# Patient Record
Sex: FEMALE | Race: WHITE | NOT HISPANIC OR LATINO | URBAN - METROPOLITAN AREA
[De-identification: names, ages, dates, MRNs, and addresses within clinical notes are randomized per-mention and may not be internally consistent; named-entity substitution may affect disease eponyms.]

---

## 2018-05-30 ENCOUNTER — RECORDS - HEALTHEAST (OUTPATIENT)
Dept: LAB | Facility: CLINIC | Age: 68
End: 2018-05-30

## 2018-05-30 LAB
ANION GAP SERPL CALCULATED.3IONS-SCNC: 8 MMOL/L (ref 5–18)
BASOPHILS # BLD AUTO: 0 THOU/UL (ref 0–0.2)
BASOPHILS NFR BLD AUTO: 1 % (ref 0–2)
BUN SERPL-MCNC: 19 MG/DL (ref 8–22)
CALCIUM SERPL-MCNC: 10 MG/DL (ref 8.5–10.5)
CHLORIDE BLD-SCNC: 105 MMOL/L (ref 98–107)
CO2 SERPL-SCNC: 30 MMOL/L (ref 22–31)
CREAT SERPL-MCNC: 0.86 MG/DL (ref 0.6–1.1)
EOSINOPHIL # BLD AUTO: 0.2 THOU/UL (ref 0–0.4)
EOSINOPHIL NFR BLD AUTO: 4 % (ref 0–6)
ERYTHROCYTE [DISTWIDTH] IN BLOOD BY AUTOMATED COUNT: 15.1 % (ref 11–14.5)
FERRITIN SERPL-MCNC: 270 NG/ML (ref 10–130)
GFR SERPL CREATININE-BSD FRML MDRD: >60 ML/MIN/1.73M2
GLUCOSE BLD-MCNC: 79 MG/DL (ref 70–125)
HCT VFR BLD AUTO: 34.5 % (ref 35–47)
HGB BLD-MCNC: 10.5 G/DL (ref 12–16)
LYMPHOCYTES # BLD AUTO: 1.7 THOU/UL (ref 0.8–4.4)
LYMPHOCYTES NFR BLD AUTO: 28 % (ref 20–40)
MCH RBC QN AUTO: 28.2 PG (ref 27–34)
MCHC RBC AUTO-ENTMCNC: 30.4 G/DL (ref 32–36)
MCV RBC AUTO: 93 FL (ref 80–100)
MONOCYTES # BLD AUTO: 0.3 THOU/UL (ref 0–0.9)
MONOCYTES NFR BLD AUTO: 6 % (ref 2–10)
NEUTROPHILS # BLD AUTO: 3.6 THOU/UL (ref 2–7.7)
NEUTROPHILS NFR BLD AUTO: 62 % (ref 50–70)
PLATELET # BLD AUTO: 212 THOU/UL (ref 140–440)
PMV BLD AUTO: 9 FL (ref 8.5–12.5)
POTASSIUM BLD-SCNC: 3.9 MMOL/L (ref 3.5–5)
RBC # BLD AUTO: 3.72 MILL/UL (ref 3.8–5.4)
SODIUM SERPL-SCNC: 143 MMOL/L (ref 136–145)
WBC: 5.8 THOU/UL (ref 4–11)

## 2020-10-19 ENCOUNTER — HOSPITAL ENCOUNTER (OUTPATIENT)
Dept: NEUROLOGY | Facility: CLINIC | Age: 70
Setting detail: THERAPIES SERIES
Discharge: STILL ADMITTED OR ADMINISTRATIVE | End: 2020-10-19
Attending: PSYCHOLOGIST

## 2020-10-19 DIAGNOSIS — F43.23 ADJUSTMENT DISORDER WITH MIXED ANXIETY AND DEPRESSED MOOD: ICD-10-CM

## 2020-11-12 ENCOUNTER — HOSPITAL ENCOUNTER (OUTPATIENT)
Dept: NEUROLOGY | Facility: CLINIC | Age: 70
Setting detail: THERAPIES SERIES
Discharge: STILL ADMITTED OR ADMINISTRATIVE | End: 2020-11-12
Attending: PSYCHOLOGIST

## 2020-11-12 DIAGNOSIS — F43.23 ADJUSTMENT DISORDER WITH MIXED ANXIETY AND DEPRESSED MOOD: ICD-10-CM

## 2020-12-07 ENCOUNTER — HOSPITAL ENCOUNTER (OUTPATIENT)
Dept: NEUROLOGY | Facility: CLINIC | Age: 70
Setting detail: THERAPIES SERIES
Discharge: STILL ADMITTED OR ADMINISTRATIVE | End: 2020-12-07
Attending: PSYCHOLOGIST

## 2020-12-07 DIAGNOSIS — F43.23 ADJUSTMENT DISORDER WITH MIXED ANXIETY AND DEPRESSED MOOD: ICD-10-CM

## 2020-12-28 ENCOUNTER — HOSPITAL ENCOUNTER (OUTPATIENT)
Dept: NEUROLOGY | Facility: CLINIC | Age: 70
Setting detail: THERAPIES SERIES
Discharge: STILL ADMITTED OR ADMINISTRATIVE | End: 2020-12-28
Attending: PSYCHOLOGIST

## 2020-12-28 DIAGNOSIS — F33.0 MAJOR DEPRESSIVE DISORDER, RECURRENT EPISODE, MILD WITH ANXIOUS DISTRESS (H): ICD-10-CM

## 2021-01-13 ENCOUNTER — HOSPITAL ENCOUNTER (OUTPATIENT)
Dept: NEUROLOGY | Facility: CLINIC | Age: 71
Setting detail: THERAPIES SERIES
Discharge: STILL ADMITTED OR ADMINISTRATIVE | End: 2021-01-13
Attending: PSYCHOLOGIST

## 2021-01-13 DIAGNOSIS — F32.1 MAJOR DEPRESSIVE DISORDER, SINGLE EPISODE, MODERATE WITH ANXIOUS DISTRESS (H): ICD-10-CM

## 2021-02-03 ENCOUNTER — HOSPITAL ENCOUNTER (OUTPATIENT)
Dept: NEUROLOGY | Facility: CLINIC | Age: 71
Setting detail: THERAPIES SERIES
Discharge: STILL ADMITTED OR ADMINISTRATIVE | End: 2021-02-03
Attending: PSYCHOLOGIST

## 2021-02-03 DIAGNOSIS — F33.0 MAJOR DEPRESSIVE DISORDER, RECURRENT EPISODE, MILD WITH ANXIOUS DISTRESS (H): ICD-10-CM

## 2021-02-16 ENCOUNTER — HOSPITAL ENCOUNTER (OUTPATIENT)
Dept: NEUROLOGY | Facility: CLINIC | Age: 71
Setting detail: THERAPIES SERIES
Discharge: STILL ADMITTED OR ADMINISTRATIVE | End: 2021-02-16
Attending: PSYCHOLOGIST

## 2021-02-16 DIAGNOSIS — F33.0 MAJOR DEPRESSIVE DISORDER, RECURRENT EPISODE, MILD WITH ANXIOUS DISTRESS (H): ICD-10-CM

## 2021-03-04 ENCOUNTER — HOSPITAL ENCOUNTER (OUTPATIENT)
Dept: NEUROLOGY | Facility: CLINIC | Age: 71
Setting detail: THERAPIES SERIES
Discharge: STILL ADMITTED OR ADMINISTRATIVE | End: 2021-03-04
Attending: PSYCHOLOGIST

## 2021-03-04 DIAGNOSIS — F32.0 MAJOR DEPRESSIVE DISORDER, SINGLE EPISODE, MILD WITH ANXIOUS DISTRESS (H): ICD-10-CM

## 2021-03-24 ENCOUNTER — HOSPITAL ENCOUNTER (OUTPATIENT)
Dept: NEUROLOGY | Facility: CLINIC | Age: 71
Setting detail: THERAPIES SERIES
Discharge: STILL ADMITTED OR ADMINISTRATIVE | End: 2021-03-24
Attending: PSYCHOLOGIST

## 2021-03-24 DIAGNOSIS — F32.0 MAJOR DEPRESSIVE DISORDER, SINGLE EPISODE, MILD WITH ANXIOUS DISTRESS (H): ICD-10-CM

## 2021-05-25 ENCOUNTER — RECORDS - HEALTHEAST (OUTPATIENT)
Dept: ADMINISTRATIVE | Facility: CLINIC | Age: 71
End: 2021-05-25

## 2021-05-26 ENCOUNTER — RECORDS - HEALTHEAST (OUTPATIENT)
Dept: ADMINISTRATIVE | Facility: CLINIC | Age: 71
End: 2021-05-26

## 2021-05-27 ENCOUNTER — RECORDS - HEALTHEAST (OUTPATIENT)
Dept: ADMINISTRATIVE | Facility: CLINIC | Age: 71
End: 2021-05-27

## 2021-05-28 ENCOUNTER — RECORDS - HEALTHEAST (OUTPATIENT)
Dept: ADMINISTRATIVE | Facility: CLINIC | Age: 71
End: 2021-05-28

## 2021-05-29 ENCOUNTER — RECORDS - HEALTHEAST (OUTPATIENT)
Dept: ADMINISTRATIVE | Facility: CLINIC | Age: 71
End: 2021-05-29

## 2021-05-30 ENCOUNTER — RECORDS - HEALTHEAST (OUTPATIENT)
Dept: ADMINISTRATIVE | Facility: CLINIC | Age: 71
End: 2021-05-30

## 2021-06-12 NOTE — PROGRESS NOTES
"Initial Psychology Consultation (Standard)    Aurelia Carrion  [id number]  Consult Date: 10/19/2020    Reason for Referral:  The patient is a 69 y.o. year old,  individual who is self-referred  for an evaluation of emotional and behavioral functioning.   This writer originally had contact with the patient's  who was referred to address coping with cognitive change.  We mutually agreed that the patient would benefit from psychology services more than her  as she adjusts to her caregiver role. Collateral information was obtained from a review of the electronic medical record via [employer].   Patient was informed of the role of psychology services, the foreseeable risks and benefits, the limits of confidentiality, and the responsibilities of a mandated .   The patient agreed to proceed.  Patient's  is aware that the the patient is meeting with this writer.    After review of the patient's situation, this visit was changed from an in-person visit to a video visit via [employer] (by email invitation) to reduce the risk of COVID 19 exposure.  Patient was informed that policies and procedures that govern in-person sessions would also apply to video sessions.  Patient was also informed that video sessions would be discontinued when COVID 19 exposure is no longer a concern (as determined by [facility]).      Patient distance location: home  Provider distance location: [facility] Neurology [place]/[facility]    Patient was in agreement with proceeding with a video session.      History of Presenting problem: Patient reports frequent fear about \"what's next? What will the future bring? What will it be like?\"  She also describes lacking confidence in her caregiving \"I doubt myself.  I envisioned life so differently\"  Patient reflects on \"I have no control\" and \"It is the hardest thing I have ever faced.\"  \"My whole life has changed and I haven't " "accepted it yet.\"    We discuss the numerous ingredients involved in acceptance and resilience.  The writer reassures the patient that she is already utilizing numerous strategies to navigate her situation successfully.  This writer encourages the patient to resume soothing activities like sewing which is relaxing for her.      Patient reflects how COVID and the pending winter complicate her situation.  She is able to identify a number of meaningful activities that would work for the winter (games, puzzles, walking, stationary bike, yoga, looking at photo albums).    Patient indicates that her  is currently going through an evaluation at [facility] to obtain another opinion.  She reports that he was started on paxil but she does not see any difference in his anxiety or preoccupation with food.  Patient reports feeling that obtaining an opinion from [facility] will help her to deepen her acceptance of her situation.    Patient endorses depressed and anxious mood.  She denies suicidal ideation, intention, or plan.  She endorsed feeling anger and irritability.  Patient also reports feeling guilt when she enjoys herself when spending time with others without her .    No standardized assessment tools were administered.    No non-personal contextual factors are reported.    Social History: Patient lives in her own home with her  of 50 plus years.  She has 2 adult children who are actively involved in her life.  She reports having an excellent support system of friends and family.  Patient does not report economic concerns.  She does not report a large role for her spiritual beliefs or Sabianist community.  Patient reports no history of abuse.  She achieved developmental milestones in the expected fashion.  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  She is retired from working for the [employer] in the [employer] program.    Medical History: Patient reports " being in good general physical health. The patient does not report cultural factors impacting pursuit of care.  The patient pursues standard medical care. Patient's history is significant for hypertension, hyperlipidemia, Rheumatoid Arthristis, Chronic kidney disease, osteopenia, history of cervical cancer in 2013.  Family history is significant for her father () with a history of colon cancer and her mother (deceasd) with hypertension, glaucoma, and osteoporosis.    Medications include: lisinopril, norvasc, hydrochlorothiazide, methotrexate.    Psychiatric History: Patient reports no history significant for mental health or chemical health issues.  Family history is significant for her father with alcohol use issues and her daughter with depression.  A CAGE Questionnaire was not administered secondary to absence of chemical use history.    Mental Status Exam:    Grooming:  good    Attire:  appropriate    Age:  Looks stated age    Attitude during interview: friendly    Participation: cooperative     Motor activity:  Within normal limits (WNL); ambulation not observed    Eye contact:  appropriate    Mood:  stable    Affect:  tearful    Speech/language:  WNL    Attention:  WNL    Concentration:  WNL    Thought Process:  stable    Thought Content:  WNL    Orientation:  Fully oriented    Memory:  WNL    Judgement: WNL    Estimated intelligence:  average    Demonstrated insight: good    Fund of Knowledge: good    Clinical Summary:  Patient is a 69 year old,  individual who is self-referred for support as she navigates acceptance of cognitive change in her  and her cargiving role.  This writer originally met the patient when her  was referred for treatment of depression as it related to cognitive change.  We mutually agreed that her  seemed to be coping reasonably well.  The patient, however, would benefit from support.  The patient and her  were in agreement that the psychology  services would be utilized by the patient.    The patient endorses sad and anxious mood as she tries to deepen her acceptance of her 's changing cognition.  She also describes experiencing significant anger about her situation.  We discussed a variety of strategies including writing down her angry thoughts as a place to start to deepen her acceptance as well as continuing with additional sessions with this writer.    Patient strengths include no premorbid history significant for mental health or chemical health issues and an excellent support system.  Patient also has good insight into her emotions and experience.    Diagnosis: Adjustment disorder with depressed and anxious mood    Plan:  Patient will return in 2 weeks where we will continue cognitive behavioral therapy to promote adaptive coping with new care giver role and acceptance of her 's changing cognition.  Estimated duration of services is 4 sessions (77824) at 2 week intervals and 3 sessions (21469) at one month intervals.  It is expected that treatment will be completed by 4/1/2021.  Patient was in agreement with this plan.  Research suggests that untreated mood disturbances associated with being a care giver may result in the development of further psychiatric and medical problems.  Thus, further services appear warranted at this time.

## 2021-06-13 NOTE — PROGRESS NOTES
Psychology Progress Note    Date: 11/12/2020    Time length and type of treatment: 1709-6693 , individual therapy, telephone/video visit    Necessity: This session is necessary to address adjustment issues with features of depression and anxiety as the patient adapts to the caregiver role for her  with cognitive change.  We initiate the patient's treatment plan.  Patient rates her anxiety on the CONNOR-7 as 8 (out of 21) indicating moderate anxiety.  Patient rates her depression on the PHQ-9 as 7 (out of 27) as 7 indicating moderate depression.  Patient gives her verbal consent to her treatment plan which we discuss.  Verbal consent is in lieu of a signature due to the nature of a virtual visit.  Today we focus on the patient's depression and anxiety treatment plan, specifically exploring processing grief and relaxation techniques.  The reader is invited to review the patient's full treatment plan in the Media section of the patient's Epic medical record.     After review of the patient's situation, this visit was changed from an in-person visit to a video/telephone visit via [employer] (by invitation) to reduce the risk of COVID 19 exposure. We were forced to complete the session by telephone due to connectivity issues. Patient was informed that policies and procedures that govern in-person sessions would also apply to video/telephone sessions.  Patient was also informed that video/telephone sessions would be discontinued when COVID 19 exposure is no longer a concern (as determined by [facility]).      Patient distance location: home  Provider distance location: [facility] Neurology [place]/[facility]    Patient was in agreement with proceeding with a video/telephone session.    Intervention: Patient reports having a more difficult time with the arrival of winter and finding a routine and rhythm that works for her  and her.  She describes meetings with [place] which  "confirm her 's cognitive change still with a diagnosis of MCI.  Patient indicates growing acceptance and that there is no \"magic pill.\"    We explore the patient's grief and the way in which it underlies her feelings of anger and irritability.  Patient acknowledges trying to stop her tears \"because I need to be strong.\"  We discuss the importance of allowing herself to cry.  She may see that as tears increase that irritability will decrease.  Eventually, tears will also decrease.  Patient reports finding this discussion helpful.      This writer utilized motivational interviewing, active listening, reassurance and support in the context of cognitive behavioral therapy and ACT therapy to address the above.      Mental Status: Orientation to person, place, and time is in tact.  Recent and remote memory, attention, concentration, language, and fund of knowledge are intact.  Mood appears stable overall with tearful affect.  No indication of suicidal ideation, plan, or intent.  No indication of homicidal ideation, plan or intent.    Progress: Patient reports continuing to struggle with acceptance of her situation as a caregiver. Progress is good with maintaining stable mood overall and deepening insight and acceptance regarding her situation    Plan: Patient will return in 2 weeks where we will continue cognitive behavioral therapy to promote adaptive coping with new care giver role and acceptance of her 's changing cognition.  Estimated duration of services is 4 sessions (71308) at 2 week intervals and 2 sessions (40075) at one month intervals.  It is expected that treatment will be completed by 3/1/2021.  Patient was in agreement with this plan.  Research suggests that untreated mood disturbances associated with being a care giver may result in the development of further psychiatric and medical problems.  Thus, further services appear warranted at this time.    Diagnosis:  Adjustment disorder with " depressed and anxious mood

## 2021-06-13 NOTE — PROGRESS NOTES
"Psychology Progress Note    Date: 12/7/2020    Time length and type of treatment: 2391-8005 , individual therapy, telephone visit    Necessity: This session is necessary to address adjustment issues with features of depression and anxiety as the patient adapts to the caregiver role for her  with cognitive change.   Today we focus on the patient's depression and anxiety treatment plan, specifically exploring processing grief, cognitive messages that exacerbate depression and anxiety, and relaxation techniques.  The reader is invited to review the patient's full treatment plan in the Media section of the patient's Epic medical record.     After review of the patient's situation, this visit was changed from an in-person visit to a video/telephone visit via [employer] (by invitation) to reduce the risk of COVID 19 exposure. We were forced to complete the session by telephone due to connectivity issues. Patient was informed that policies and procedures that govern in-person sessions would also apply to video/telephone sessions.  Patient was also informed that video/telephone sessions would be discontinued when COVID 19 exposure is no longer a concern (as determined by [facility]).       Patient distance location: home  Provider distance location: [facility] Neurology [place]/[facility]     Patient was in agreement with proceeding with a video/telephone session.    Intervention: Patient reports \"good days and bad days\".  Patient reports that she becomes upset and frustrated because of her 's eating behaviors and isolation due to the pandemic.  Patient reflects on how difficult it is to not be able to access resources because of the pandemic.  Patient acknowledges meeting neighbors when they go on walks.  We explore how the patient can trust herself more.  We explore how the patient can be less critical of herself.      This writer utilized motivational interviewing, " "active listening, reassurance and support in the context of cognitive behavioral therapy and ACT therapy to address the above.      Mental Status: Orientation to person, place, and time is in tact.  Recent and remote memory, attention, concentration, language, and fund of knowledge are intact. Mood appears stable with sad and tearful affect.  No indication of suicidal ideation, plan, or intent.  No indication of homicidal ideation, plan or intent.    Progress: Patient reports having \"good days and bad days\".  Progress is improving with deepening acceptance of her 's changes.  Progress is slow with compassion towards herself.    Plan: Patient will return in 2 weeks where we will continue cognitive behavioral therapy to promote adaptive coping with new care giver role and acceptance of her 's changing cognition.  Estimated duration of services is 4 sessions (69894) at 2 week intervals and 2 sessions (84274) at one month intervals.  It is expected that treatment will be completed by 3/1/2021.  Patient was in agreement with this plan.  Research suggests that untreated mood disturbances associated with being a care giver may result in the development of further psychiatric and medical problems.  Thus, further services appear warranted at this time.    Diagnosis:  Adjustment disorder with depressed and anxious mood  "

## 2021-06-14 NOTE — PROGRESS NOTES
"Psychology Progress Note    Date: 1/13/2021    Time length and type of treatment: 2105-0754 , individual therapy, telephone visit    Necessity:  This session is necessary to address adjustment issues with features of depression and anxiety as the patient adapts to the caregiver role for her  with cognitive change.   Today we focus on the patient's depression and anxiety treatment plan, specifically exploring processing grief, cognitive messages that exacerbate depression and anxiety, and relaxation techniques.  The reader is invited to review the patient's full treatment plan in the Media section of the patient's Epic medical record.     After review of the patient's situation, this visit was changed from an in-person visit to a telephone visit via [employer] to reduce the risk of COVID 19 exposure.  Patient was given the option of a video visit.  We attempted to connect via [employer] but did not succeed.   Patient was informed that policies and procedures that govern in-person sessions would also apply to telephone sessions.  Patient was also informed that telephone sessions would be discontinued when COVID 19 exposure is no longer a concern (as determined by [facility]).      Patient distance location: telephone  Provider distance location: [facility] Neurology [place]/[facility]    Patient was in agreement with proceeding with a telephone session.    Intervention: \"I'm the same\".  Patient reports continuing to have angry outbursts.  We explore what is contributing to the patient's anger.      \"Our daughters want us to move.\"  Patient reports that taking care of the house is too much.  Patient reports \"I am scared to give up the house.  It gives me something to do.\"  Patient reports worry about the impact on her  if they move.  This writer reassures the patient that her  will make a good transition and will follow her lead.  Patient acknowledges some " excitement aobut planning a move.    Patient will be meeting with PCP to discuss anti-depressant.    This writer utilized motivational interviewing, active listening, reassurance and support in the context of cognitive behavioral therapy and ACT therapy to address the above.      Mental Status: Orientation to person, place, and time is in tact.  Recent and remote memory, attention, concentration, language, and fund of knowledge are intact.  Mood appears depressed with tearful affect.  No indication of suicidal ideation, plan, or intent.  No indication of homicidal ideation, plan or intent.    Progress: Patient reports continuing to feel angry and sad.  Progress is slow with stabilizing mood.    Plan: Patient will return in 2 weeks where we will continue cognitive behavioral therapy to promote adaptive coping with new care giver role and acceptance of her 's changing cognition.  Estimated duration of services is 4 sessions (45849) at 2 week intervals and 2 sessions (11597) at one month intervals.  It is expected that treatment will be completed by 5/1/2021.  Patient was in agreement with this plan.  Research suggests that untreated mood disturbances associated with being a care giver may result in the development of further psychiatric and medical problems.  Thus, further services appear warranted at this time.    Diagnosis:  Major depressive disorder, single episode, mild to moderate with anxious distress

## 2021-06-14 NOTE — PROGRESS NOTES
"Psychology Progress Note    Date: 2/3/2021    Time length and type of treatment: 2218-9003, individual therapy, telephone visit    Necessity:  This session is necessary to address adjustment issues with features of depression and anxiety as the patient adapts to the caregiver role for her  with cognitive change.   Today we focus on the patient's depression and anxiety treatment plan, specifically exploring processing grief, cognitive messages that exacerbate depression and anxiety, and relaxation techniques.  The reader is invited to review the patient's full treatment plan in the Media section of the patient's Epic medical record.     After review of the patient's situation, this visit was changed from an in-person visit to a telephone visit via [email or web address] to reduce the risk of COVID 19 exposure.  Patient was given the option of a video visit.  We attempted to connect via [employer] but did not succeed.   Patient was informed that policies and procedures that govern in-person sessions would also apply to telephone sessions.  Patient was also informed that telephone sessions would be discontinued when COVID 19 exposure is no longer a concern (as determined by [facility]).       Patient distance location: telephone  Provider distance location: [facility] Neurology [place]/[facility]     Patient was in agreement with proceeding with a telephone session.    Intervention: Patient reports doing better since starting anti-depressant medication.  Patient reports feeling more accepting of her situation.      We discuss how the patient would like to feel more confidence in her 's physician.  Patient reports continuing to feel \"overwhelmed.\"   We discuss a strategy for approaching projects like taxes (short 30 minute sessions).      Patient reports that a daily meditation/reflection mehnaz has helped.    Patient wonders when her  should retest.  We discuss the options.  This " writer encourages the patient to check in with her 's neurologist and neuropsychologist.    This writer utilized motivational interviewing, active listening, reassurance and support in the context of cognitive behavioral therapy and ACT therapy to address the above.      Mental Status: Orientation to person, place, and time is in tact.  Recent and remote memory, attention, concentration, language, and fund of knowledge are intact.  Mood appears stable with tearful, anxious affect.  No indication of suicidal ideation, plan, or intent.  No indication of homicidal ideation, plan or intent.    Progress: Patient reports doing a little better.  Progress is good with stabilizing mood and deepening acceptance.    Plan: Patient will return in 2 weeks where we will continue cognitive behavioral therapy to promote adaptive coping with new care giver role and acceptance of her 's changing cognition.  Estimated duration of services is 4 sessions (14789) at 2 week intervals and 2 sessions (55927) at one month intervals.  It is expected that treatment will be completed by 5/1/2021.  Patient was in agreement with this plan.  Research suggests that untreated mood disturbances associated with being a care giver may result in the development of further psychiatric and medical problems.  Thus, further services appear warranted at this time.    Diagnosis:  Major depressive disorder, single episode, mild to moderate with anxious distress

## 2021-06-14 NOTE — PROGRESS NOTES
"Psychology Progress Note    Date: 12/28/2020    Time length and type of treatment: 4941-4754 , individual therapy, video visit    Necessity: This session is necessary to address adjustment issues with features of depression and anxiety as the patient adapts to the caregiver role for her  with cognitive change.   Today we focus on the patient's depression and anxiety treatment plan, specifically exploring processing grief, cognitive messages that exacerbate depression and anxiety, and relaxation techniques.  The reader is invited to review the patient's full treatment plan in the Media section of the patient's Epic medical record.     After review of the patient's situation, this visit was changed from an in-person visit to a video visit via [employer] (via email invitation) to reduce the risk of COVID 19 exposure.    Patient was informed that policies and procedures that govern in-person sessions would also apply to video sessions.  Patient was also informed that video sessions would be discontinued when COVID 19 exposure is no longer a concern (as determined by [facility]).      Patient distance location: home  Provider distance location: [facility] [place]/[facility]    Patient was in agreement with proceeding with a video session.    Intervention: Patient reports that she has not been doing well.  She describes frustration, losing her temper, feelings of hopelessness, feelings that she is a failure as a caregiver, frequent tearfulness.  \"I just don't feel like myself.\"  Patient also reports sleeping poorly.  We explore the patient's concerns about starting medication and beliefs about herself that she might need medication.  This writer provides education about depression and the value of medication.  We discuss that the patient will approach her PCP about starting an anti-depressant.    We also discuss strategies to introduce more relaxed, peaceful times " in the day such as sharing a daily meditation or prayer or encouraging words at meal time.    This writer utilized motivational interviewing, active listening, reassurance and support in the context of cognitive behavioral therapy and ACT therapy to address the above.      Mental Status: Orientation to person, place, and time is in tact.  Recent and remote memory, attention, concentration, language, and fund of knowledge are intact.  Mood appears depressed with tearful affect.  No indication of suicidal ideation, plan, or intent.  No indication of homicidal ideation, plan or intent.    Progress: Patient reports not doing well emotionally (anger, frustration, tearfulness, poor sleep).  Progress is slow with stabilizing mood.    Plan: Patient will return in 2 weeks where we will continue cognitive behavioral therapy to promote adaptive coping with new care giver role and acceptance of her 's changing cognition.  Estimated duration of services is 4 sessions (27518) at 2 week intervals and 2 sessions (23079) at one month intervals.  It is expected that treatment will be completed by 5/1/2021.  Patient was in agreement with this plan.  Research suggests that untreated mood disturbances associated with being a care giver may result in the development of further psychiatric and medical problems.  Thus, further services appear warranted at this time.    Diagnosis:  Major depressive disorder, single episode,mild to moderate with anxious distress

## 2021-06-15 NOTE — PROGRESS NOTES
"Psychology Progress Note    Date: 2/16/2021    Time length and type of treatment: 5456-8432 , individual therapy, video visit    Necessity: This session is necessary to address adjustment issues with features of depression and anxiety as the patient adapts to the caregiver role for her  with cognitive change.    We update the patient's treatment plan today.  Patient rates her anxiety on the CONNOR-7 as 13 indicating moderately severe anxiety.  This represents a deterioration in mood over the previous rating of moderate anxiety, 8.  Patient rates her depression on the PHQ-9 as 5 indicating mild depression.  This represents an improvement in mood over the patient's previous rating of moderate, 7.  Patient gives verbal consent to her treatment plan which we discuss.  Verbal consent is in lieu of a signature secondary to a virtual session. Today we focus on the patient's depression and anxiety treatment plan, specifically exploring processing grief, cognitive messages that exacerbate depression and anxiety, and relaxation techniques.  The reader is invited to review the patient's full treatment plan in the Media section of the patient's Epic medical record.     After review of the patient's situation, this visit was changed from an in-person visit to a video visit via [employer] (email invitation) to reduce the risk of COVID 19 exposure.   Patient was informed that policies and procedures that govern in-person sessions would also apply to video sessions.  Patient was also informed that video sessions would be discontinued when COVID 19 exposure is no longer a concern (as determined by [facility]).      Patient distance location: home  Provider distance location: [facility] [place]/[facility]    Patient was in agreement with proceeding with a video session.    Intervention: Patient reports doing better.  She describes being more able to \"let go of things.\"  She " "reports feeling less of a need \"to control everything.\"  Patient reports feeling more hopeful and less angry.    We discuss the patient's sleep issues.  The patient is encouraged to have a bed in the guest room made up as an option if she feels she needs more of a restful night.  We explore that it does not have to be an \"all or nothing\" decision.  Patient reports finding this helpful.    Patient is especially pleased that her  and she have an appointment for getting the vaccine.    This writer utilized motivational interviewing, active listening, reassurance and support in the context of cognitive behavioral therapy and ACT therapy to address the above.      Mental Status: Orientation to person, place, and time is in tact.  Recent and remote memory, attention, concentration, language, and fund of knowledge are intact.  Mood appears stable with mildly anxious affect.  No indication of suicidal ideation, plan, or intent.  No indication of homicidal ideation, plan or intent.    Progress: Patient reports feeling better.  Progress is good with deepening acceptance of her 's cognitive change and stabilizing mood.    Plan: Patient will return in 2 weeks where we will continue cognitive behavioral therapy to promote adaptive coping with new care giver role and acceptance of her 's changing cognition.  Estimated duration of services is 4 sessions (68060) at 2 week intervals and 2 sessions (54437) at one month intervals.  It is expected that treatment will be completed by 5/1/2021.  Patient was in agreement with this plan.  Research suggests that untreated mood disturbances associated with being a care giver may result in the development of further psychiatric and medical problems.  Thus, further services appear warranted at this time.    Diagnosis:  Major depressive disorder, single episode, mild with anxious distress  "

## 2021-06-15 NOTE — PROGRESS NOTES
"Psychology Progress Note    Date: 3/4/2021    Time length and type of treatment: 8040-4567, individual therapy, video visit    Necessity: This session is necessary to address adjustment issues with features of depression and anxiety as the patient adapts to the caregiver role for her  with cognitive change.     Today we focus on the patient's depression and anxiety treatment plan, specifically exploring processing grief, cognitive messages that exacerbate depression and anxiety, and relaxation techniques.  The reader is invited to review the patient's full treatment plan in the Media section of the patient's Epic medical record.     After review of the patient's situation, this visit was changed from an in-person visit to a video visit via [employer] (email invitation) to reduce the risk of COVID 19 exposure.   Patient was informed that policies and procedures that govern in-person sessions would also apply to video sessions.  Patient was also informed that video sessions would be discontinued when COVID 19 exposure is no longer a concern (as determined by [facility]).       Patient distance location: home  Provider distance location: [facility] [place]/[facility]     Patient was in agreement with proceeding with a video session.    Intervention: Patient reports continuing to have more stable mood.  She indicates frustration and anger continue to occur although less frequently, less intensity, shorter duration.  We explore what may be contributing to frustration and anger.  Patient describes \"having to do everything.\"  We discuss how the patient may still be hypervigilant with keeping her  active and engaged.  We discuss the concept of balance between rest and stimulation.  We discuss that the patient does not have to be with her  every minute, keeping him busy every minute.  We also discuss trying a stress ball for  to see if that " "decreases vocalizations which the patient finds disruptive.    This writer utilized motivational interviewing, active listening, reassurance and support in the context of cognitive behavioral therapy and ACT therapy to address the above.      Mental Status: Orientation to person, place, and time is in tact.  Recent and remote memory, attention, concentration, language, and fund of knowledge are intact.  Mood appears stable with anxious affect.  No indication of suicidal ideation, plan, or intent.  No indication of homicidal ideation, plan or intent.    Progress: Patient reports doing better but continues to have frustration and anger.  Progress is good with improving mood stability.  Progress remains slow regarding patient's need to \"fix\" her 's cognitive deficits.    Plan: Patient will return in 2 weeks where we will continue cognitive behavioral therapy to promote adaptive coping with new care giver role and acceptance of her 's changing cognition.  Estimated duration of services is 1 sessions (47835) at 2 week intervals and 2 sessions (69509) at one month intervals.  It is expected that treatment will be completed by 5/1/2021.  Patient was in agreement with this plan.  Research suggests that untreated mood disturbances associated with being a care giver may result in the development of further psychiatric and medical problems.  Thus, further services appear warranted at this time.    Diagnosis:  Major depressive disorder, single episode mild with anxious distress  "

## 2021-06-16 NOTE — PROGRESS NOTES
"Psychology Progress Note    Date: 3/24/2021    Time length and type of treatment: 2723-4420 , individual therapy, video visit    Necessity: This session is necessary to address adjustment issues with features of depression and anxiety as the patient adapts to the caregiver role for her  with cognitive change.     Today we focus on the patient's depression and anxiety treatment plan, specifically exploring processing grief, cognitive messages that exacerbate depression and anxiety, and relaxation techniques.  The reader is invited to review the patient's full treatment plan in the Media section of the patient's Epic medical record.     After review of the patient's situation, this visit was changed from an in-person visit to a video visit via [employer] (email invitation) to reduce the risk of COVID 19 exposure.   Patient was informed that policies and procedures that govern in-person sessions would also apply to video sessions.  Patient was also informed that video sessions would be discontinued when COVID 19 exposure is no longer a concern (as determined by [facility]).       Patient distance location: home  Provider distance location: [facility] [place]     Patient was in agreement with proceeding with a video session.    Intervention: Patient reports doing better.  She describes being able to be more balanced with her expectations and \"not having to be on the treadmill all the time.\"    We explore the features of resilience.  We discuss how the patient will continue to experience sadness and anger at times but will be more able to move forward.    Patient describes her trepidation about what will be safe moving forward vis-a-vis the pandemic.  We discuss taking small steps.    This writer utilized motivational interviewing, active listening, reassurance and support in the context of cognitive behavioral therapy and ACT therapy to address the " above.      Mental Status: Orientation to person, place, and time is in tact.  Recent and remote memory, attention, concentration, language, and fund of knowledge are intact.  Mood appears stable with calm affect  No indication of suicidal ideation, plan, or intent.  No indication of homicidal ideation, plan or intent.    Progress: Patient reports doing better.  Progress is good with maintaining stable mood.    Plan: Patient will return in 8-10 weeks where we will continue cognitive behavioral therapy to promote adaptive coping with new care giver role and acceptance of her 's changing cognition.  Estimated duration of services is 1 more sessions (95995) for the purpose of a check-in.    It is expected that treatment will be completed by 7/1/2021.  Patient was in agreement with this plan.  Research suggests that untreated mood disturbances associated with being a care giver may result in the development of further psychiatric and medical problems.  Thus, further services appear warranted at this time.    Diagnosis:  Major depressive disorder, single episode, mild with anxious distress

## 2021-07-21 ENCOUNTER — RECORDS - HEALTHEAST (OUTPATIENT)
Dept: ADMINISTRATIVE | Facility: CLINIC | Age: 71
End: 2021-07-21